# Patient Record
Sex: FEMALE | Race: WHITE
[De-identification: names, ages, dates, MRNs, and addresses within clinical notes are randomized per-mention and may not be internally consistent; named-entity substitution may affect disease eponyms.]

---

## 2019-07-15 ENCOUNTER — HOSPITAL ENCOUNTER (INPATIENT)
Dept: HOSPITAL 92 - L&D | Age: 24
LOS: 3 days | Discharge: HOME | End: 2019-07-18
Attending: OBSTETRICS & GYNECOLOGY | Admitting: OBSTETRICS & GYNECOLOGY
Payer: COMMERCIAL

## 2019-07-15 VITALS — BODY MASS INDEX: 38.7 KG/M2

## 2019-07-15 DIAGNOSIS — Z3A.37: ICD-10-CM

## 2019-07-15 DIAGNOSIS — E66.01: ICD-10-CM

## 2019-07-15 LAB
HBSAG INDEX: 0.29 S/CO (ref 0–0.99)
HGB BLD-MCNC: 10.6 G/DL (ref 12–16)
MCH RBC QN AUTO: 26.4 PG (ref 27–31)
MCV RBC AUTO: 82.5 FL (ref 78–98)
PLATELET # BLD AUTO: 328 THOU/UL (ref 130–400)
RBC # BLD AUTO: 4 MILL/UL (ref 4.2–5.4)
SYPHILIS ANTIBODY INDEX: 0.05 S/CO
WBC # BLD AUTO: 10.3 THOU/UL (ref 4.8–10.8)

## 2019-07-15 PROCEDURE — 36415 COLL VENOUS BLD VENIPUNCTURE: CPT

## 2019-07-15 PROCEDURE — 86780 TREPONEMA PALLIDUM: CPT

## 2019-07-15 PROCEDURE — 87340 HEPATITIS B SURFACE AG IA: CPT

## 2019-07-15 PROCEDURE — 85027 COMPLETE CBC AUTOMATED: CPT

## 2019-07-15 PROCEDURE — 51702 INSERT TEMP BLADDER CATH: CPT

## 2019-07-15 PROCEDURE — 90715 TDAP VACCINE 7 YRS/> IM: CPT

## 2019-07-15 PROCEDURE — 85025 COMPLETE CBC W/AUTO DIFF WBC: CPT

## 2019-07-15 PROCEDURE — 86850 RBC ANTIBODY SCREEN: CPT

## 2019-07-15 PROCEDURE — 86900 BLOOD TYPING SEROLOGIC ABO: CPT

## 2019-07-15 PROCEDURE — 86901 BLOOD TYPING SEROLOGIC RH(D): CPT

## 2019-07-15 NOTE — PDOC.LDHP
Labor and Delivery H&P


Chief complaint: scheduled induction


HPI: 





25 y/o G1PO at 37 and 6/7 weeks presents for evening medical induction with 

Cytotec for CHTN/Morbid Obesity.


Current gestational age (weeks): 37


Due date: 19


Grav: 1


Para: 0


Current pregnancy complications: hypertension


Abnormal US findings: No


Current medications: pre-mariia vitamins


Previous surgical history: none


Allergies/Adverse Reactions: 


 Allergies











Allergy/AdvReac Type Severity Reaction Status Date / Time


 


No Known Allergies Allergy   Verified 07/15/19 20:10











Social history: none





- Physical Exam


Vital signs reviewed and normal: yes


Heart: RRR


Lungs: CTAB


Abdomen: NTTP


Extremeties: no edema


FHT: category 1





- Assessment


L&D Assessment: medically indicated induction





- Plan


Plan: admit to L&D, cervical ripening

## 2019-07-16 PROCEDURE — 0HQ9XZZ REPAIR PERINEUM SKIN, EXTERNAL APPROACH: ICD-10-PCS | Performed by: OBSTETRICS & GYNECOLOGY

## 2019-07-16 RX ADMIN — SODIUM CHLORIDE SCH MLS: 0.9 INJECTION, SOLUTION INTRAVENOUS at 05:27

## 2019-07-17 LAB
BASOPHILS # BLD AUTO: 0 THOU/UL (ref 0–0.2)
BASOPHILS NFR BLD AUTO: 0.2 % (ref 0–1)
EOSINOPHIL # BLD AUTO: 0.1 THOU/UL (ref 0–0.7)
EOSINOPHIL NFR BLD AUTO: 0.5 % (ref 0–10)
HGB BLD-MCNC: 9.6 G/DL (ref 12–16)
LYMPHOCYTES # BLD: 1.8 THOU/UL (ref 1.2–3.4)
LYMPHOCYTES NFR BLD AUTO: 12.4 % (ref 21–51)
MCH RBC QN AUTO: 26.2 PG (ref 27–31)
MCV RBC AUTO: 82.9 FL (ref 78–98)
MONOCYTES # BLD AUTO: 1.1 THOU/UL (ref 0.11–0.59)
MONOCYTES NFR BLD AUTO: 7.7 % (ref 0–10)
NEUTROPHILS # BLD AUTO: 11.2 THOU/UL (ref 1.4–6.5)
NEUTROPHILS NFR BLD AUTO: 79.2 % (ref 42–75)
PLATELET # BLD AUTO: 236 THOU/UL (ref 130–400)
RBC # BLD AUTO: 3.67 MILL/UL (ref 4.2–5.4)
WBC # BLD AUTO: 14.1 THOU/UL (ref 4.8–10.8)

## 2019-07-17 RX ADMIN — DOCUSATE CALCIUM SCH MG: 240 CAPSULE, LIQUID FILLED ORAL at 21:25

## 2019-07-17 RX ADMIN — DOCUSATE CALCIUM SCH MG: 240 CAPSULE, LIQUID FILLED ORAL at 08:54

## 2019-07-17 NOTE — PDOC.PP
Post Partum Progress Note


Post Partum Day #: 1


PO intake tolerated: yes


Flatus: yes


Ambulation: yes


 Vital Signs (12 hours)











  Temp Pulse Resp BP BP Pulse Ox


 


 07/17/19 15:43  97.9 F  85  18  130/88   98


 


 07/17/19 12:16  97.8 F  95  20   134/86 


 


 07/17/19 08:28  97.9 F  80  20   129/74  97


 


 07/17/19 08:00       97








 Weight











Weight                         262 lb

















- Physical Examination


General: NAD


Cardiovascular: no m/r/g, RRR


Respiratory: clear to auscultation bilaterally, non-labored breathing


Abdominal: + bowel sounds, lochia, no distention


Extremities: negative homans (B)


Neurological: no gross focal deficits


Psychiatric: A&Ox3, normal affect


Result Diagrams: 


 07/17/19 05:44





Additional Labs: 


 Post Partum Labs











Blood Type  A POSITIVE   07/16/19  03:54    


 


Hep Bs Antigen  Non-Reactive S/CO (NonReactive)   07/15/19  21:32

## 2019-07-18 VITALS — SYSTOLIC BLOOD PRESSURE: 111 MMHG | DIASTOLIC BLOOD PRESSURE: 67 MMHG | TEMPERATURE: 98 F

## 2019-07-18 RX ADMIN — DOCUSATE CALCIUM SCH MG: 240 CAPSULE, LIQUID FILLED ORAL at 09:44

## 2019-07-18 RX ADMIN — SODIUM CHLORIDE SCH: 0.9 INJECTION, SOLUTION INTRAVENOUS at 07:45

## 2019-07-18 NOTE — DN
DATE OF PROCEDURE:  2019



TIME OF SERVICE:  At  [employer] Daylight Savings Time.



PREOPERATIVE DIAGNOSIS:  Intrauterine pregnancy at 38 weeks 0 days with a term

induction of labor for chronic hypertension and morbid obesity. 



POSTOPERATIVE DIAGNOSIS:  Intrauterine pregnancy at 38 weeks 0 days with a term

induction of labor for chronic hypertension and morbid obesity. 



PROCEDURE:  Spontaneous vaginal delivery over first-degree laceration of the

perineum. 



FINDINGS:  Viable female infant, weighing 3258 g or 7 pounds 3 ounces, Apgars 9 and

9. 



QUANTITATIVE BLOOD LOSS:  239 mL.



COMPLICATIONS:  None.



PROCEDURE IN DETAIL:  The patient presented to [facility]

where she was admitted to the labor and delivery service.  The patient underwent a

normal and uneventful labor with normal cervical dilatation until she was found to

be completely dilated.  She was then allowed to push and was able to bring the baby

down and delivered the baby in a vertex presentation without difficulties.  Once the

head delivered in occiput anterior position, the shoulders followed spontaneously

along with the rest of the baby's body.  Once out the baby's mouth and nose were

bulb suctioned.  The cord was clamped and cut and baby was handed to waiting

attendants.  Cord blood was collected.  Gentle fundal massage was performed and the

placenta delivered intact without problems.  Hemostasis was assured.  Quantitative

blood loss was calculated.  Inspection of the cervix, vaginal vault, and perineum

did not reveal any lacerations needing suturing.  Once again, hemostasis was within

normal limits and the patient was allowed to recover in the labor and delivery room.

 Baby went to  nursery. 





Job ID:  [id number]

## 2021-12-31 ENCOUNTER — HOSPITAL ENCOUNTER (EMERGENCY)
Dept: HOSPITAL 92 - CSHERS | Age: 26
Discharge: HOME | End: 2021-12-31
Payer: COMMERCIAL

## 2021-12-31 DIAGNOSIS — B34.9: Primary | ICD-10-CM

## 2021-12-31 DIAGNOSIS — Z79.899: ICD-10-CM

## 2021-12-31 LAB
ALBUMIN SERPL BCG-MCNC: 3.8 G/DL (ref 3.5–5)
ALP SERPL-CCNC: 50 U/L (ref 40–110)
ALT SERPL W P-5'-P-CCNC: 16 U/L (ref 8–55)
ANION GAP SERPL CALC-SCNC: 14 MMOL/L (ref 10–20)
AST SERPL-CCNC: 14 U/L (ref 5–34)
BASOPHILS # BLD AUTO: 0 10X3/UL (ref 0–0.2)
BASOPHILS NFR BLD AUTO: 0.3 % (ref 0–2)
BILIRUB SERPL-MCNC: 0.2 MG/DL (ref 0.2–1.2)
BUN SERPL-MCNC: 6 MG/DL (ref 7–18.7)
CALCIUM SERPL-MCNC: 9.3 MG/DL (ref 7.8–10.44)
CHLORIDE SERPL-SCNC: 104 MMOL/L (ref 98–107)
CO2 SERPL-SCNC: 22 MMOL/L (ref 22–29)
CREAT CL PREDICTED SERPL C-G-VRATE: 0 ML/MIN (ref 70–130)
EOSINOPHIL # BLD AUTO: 0.2 10X3/UL (ref 0–0.5)
EOSINOPHIL NFR BLD AUTO: 2.8 % (ref 0–6)
GLOBULIN SER CALC-MCNC: 3.5 G/DL (ref 2.4–3.5)
GLUCOSE SERPL-MCNC: 70 MG/DL (ref 70–105)
HGB BLD-MCNC: 11.4 G/DL (ref 12–15.5)
LYMPHOCYTES NFR BLD AUTO: 23 % (ref 18–47)
MCH RBC QN AUTO: 26.1 PG (ref 27–33)
MCV RBC AUTO: 83 FL (ref 81.6–98.3)
MONOCYTES # BLD AUTO: 0.6 10X3/UL (ref 0–1.1)
MONOCYTES NFR BLD AUTO: 7.1 % (ref 0–10)
NEUTROPHILS # BLD AUTO: 5.2 10X3/UL (ref 1.5–8.4)
NEUTROPHILS NFR BLD AUTO: 66.4 % (ref 40–75)
PLATELET # BLD AUTO: 295 10X3/UL (ref 150–450)
POTASSIUM SERPL-SCNC: 4 MMOL/L (ref 3.5–5.1)
RBC # BLD AUTO: 4.36 10X6/UL (ref 3.9–5.03)
SODIUM SERPL-SCNC: 136 MMOL/L (ref 136–145)
WBC # BLD AUTO: 7.8 10X3/UL (ref 3.5–10.5)

## 2021-12-31 PROCEDURE — 85025 COMPLETE CBC W/AUTO DIFF WBC: CPT

## 2021-12-31 PROCEDURE — 71045 X-RAY EXAM CHEST 1 VIEW: CPT

## 2021-12-31 PROCEDURE — 93005 ELECTROCARDIOGRAM TRACING: CPT

## 2021-12-31 PROCEDURE — 83880 ASSAY OF NATRIURETIC PEPTIDE: CPT

## 2021-12-31 PROCEDURE — 80053 COMPREHEN METABOLIC PANEL: CPT

## 2021-12-31 PROCEDURE — 71046 X-RAY EXAM CHEST 2 VIEWS: CPT

## 2022-03-04 ENCOUNTER — HOSPITAL ENCOUNTER (OUTPATIENT)
Dept: HOSPITAL 92 - CSHLD/OP | Age: 27
Discharge: HOME HEALTH | End: 2022-03-04
Attending: OBSTETRICS & GYNECOLOGY
Payer: COMMERCIAL

## 2022-03-04 VITALS — TEMPERATURE: 100.7 F

## 2022-03-04 VITALS — BODY MASS INDEX: 42.3 KG/M2

## 2022-03-04 DIAGNOSIS — Z3A.32: ICD-10-CM

## 2022-03-04 DIAGNOSIS — E03.9: ICD-10-CM

## 2022-03-04 DIAGNOSIS — R05.9: ICD-10-CM

## 2022-03-04 DIAGNOSIS — O99.891: ICD-10-CM

## 2022-03-04 DIAGNOSIS — O21.2: Primary | ICD-10-CM

## 2022-03-04 DIAGNOSIS — Z86.16: ICD-10-CM

## 2022-03-04 DIAGNOSIS — O99.283: ICD-10-CM

## 2022-03-04 DIAGNOSIS — Z79.899: ICD-10-CM

## 2022-03-04 PROCEDURE — 36415 COLL VENOUS BLD VENIPUNCTURE: CPT

## 2022-03-04 PROCEDURE — 96361 HYDRATE IV INFUSION ADD-ON: CPT

## 2022-03-04 PROCEDURE — 87804 INFLUENZA ASSAY W/OPTIC: CPT

## 2022-03-04 PROCEDURE — 99283 EMERGENCY DEPT VISIT LOW MDM: CPT

## 2022-03-04 PROCEDURE — 96365 THER/PROPH/DIAG IV INF INIT: CPT

## 2022-04-11 ENCOUNTER — HOSPITAL ENCOUNTER (OUTPATIENT)
Dept: HOSPITAL 92 - CSHLAB | Age: 27
Discharge: HOME | End: 2022-04-11
Attending: OBSTETRICS & GYNECOLOGY
Payer: COMMERCIAL

## 2022-04-11 DIAGNOSIS — Z20.822: Primary | ICD-10-CM

## 2022-04-11 PROCEDURE — U0005 INFEC AGEN DETEC AMPLI PROBE: HCPCS

## 2022-04-11 PROCEDURE — U0003 INFECTIOUS AGENT DETECTION BY NUCLEIC ACID (DNA OR RNA); SEVERE ACUTE RESPIRATORY SYNDROME CORONAVIRUS 2 (SARS-COV-2) (CORONAVIRUS DISEASE [COVID-19]), AMPLIFIED PROBE TECHNIQUE, MAKING USE OF HIGH THROUGHPUT TECHNOLOGIES AS DESCRIBED BY CMS-2020-01-R: HCPCS
